# Patient Record
Sex: FEMALE | Race: WHITE | NOT HISPANIC OR LATINO
[De-identification: names, ages, dates, MRNs, and addresses within clinical notes are randomized per-mention and may not be internally consistent; named-entity substitution may affect disease eponyms.]

---

## 2021-09-23 ENCOUNTER — RESULT REVIEW (OUTPATIENT)
Age: 61
End: 2021-09-23

## 2022-03-18 PROBLEM — Z00.00 ENCOUNTER FOR PREVENTIVE HEALTH EXAMINATION: Status: ACTIVE | Noted: 2022-03-18

## 2022-08-19 ENCOUNTER — NON-APPOINTMENT (OUTPATIENT)
Age: 62
End: 2022-08-19

## 2022-08-22 ENCOUNTER — APPOINTMENT (OUTPATIENT)
Dept: ENDOCRINOLOGY | Facility: CLINIC | Age: 62
End: 2022-08-22

## 2022-08-22 ENCOUNTER — TRANSCRIPTION ENCOUNTER (OUTPATIENT)
Age: 62
End: 2022-08-22

## 2022-08-22 VITALS
RESPIRATION RATE: 16 BRPM | BODY MASS INDEX: 27.14 KG/M2 | OXYGEN SATURATION: 97 % | DIASTOLIC BLOOD PRESSURE: 82 MMHG | WEIGHT: 159 LBS | TEMPERATURE: 97.5 F | HEIGHT: 64.25 IN | HEART RATE: 69 BPM | SYSTOLIC BLOOD PRESSURE: 120 MMHG

## 2022-08-22 DIAGNOSIS — Z87.891 PERSONAL HISTORY OF NICOTINE DEPENDENCE: ICD-10-CM

## 2022-08-22 DIAGNOSIS — M85.80 OTHER SPECIFIED DISORDERS OF BONE DENSITY AND STRUCTURE, UNSPECIFIED SITE: ICD-10-CM

## 2022-08-22 DIAGNOSIS — M81.0 AGE-RELATED OSTEOPOROSIS W/OUT CURRENT PATHOLOGICAL FRACTURE: ICD-10-CM

## 2022-08-22 DIAGNOSIS — Z83.3 FAMILY HISTORY OF DIABETES MELLITUS: ICD-10-CM

## 2022-08-22 DIAGNOSIS — Z82.69 FAMILY HISTORY OF OTHER DISEASES OF THE MUSCULOSKELETAL SYSTEM AND CONNECTIVE TISSUE: ICD-10-CM

## 2022-08-22 PROCEDURE — 99204 OFFICE O/P NEW MOD 45 MIN: CPT

## 2022-08-22 PROCEDURE — ZZZZZ: CPT

## 2022-08-22 PROCEDURE — 77080 DXA BONE DENSITY AXIAL: CPT

## 2022-08-23 PROBLEM — Z83.3 FAMILY HISTORY OF DIABETES MELLITUS: Status: ACTIVE | Noted: 2022-08-23

## 2022-08-23 PROBLEM — M85.80 OSTEOPENIA: Status: ACTIVE | Noted: 2022-08-23

## 2022-08-23 PROBLEM — M81.0 OSTEOPOROSIS: Status: ACTIVE | Noted: 2022-08-23

## 2022-08-23 PROBLEM — Z82.69 FAMILY HISTORY OF OSTEOPENIA: Status: ACTIVE | Noted: 2022-08-23

## 2022-08-23 PROBLEM — Z87.891 FORMER SMOKER: Status: ACTIVE | Noted: 2022-08-22

## 2022-08-23 RX ORDER — OMEGA-3/DHA/EPA/FISH OIL 300-1000MG
CAPSULE ORAL
Refills: 0 | Status: ACTIVE | COMMUNITY

## 2022-08-23 RX ORDER — VALACYCLOVIR 1 G/1
1 TABLET, FILM COATED ORAL
Qty: 63 | Refills: 0 | Status: COMPLETED | COMMUNITY
Start: 2022-06-13

## 2022-08-23 RX ORDER — MULTIVITAMIN
TABLET ORAL
Refills: 0 | Status: ACTIVE | COMMUNITY

## 2022-08-23 RX ORDER — CHROMIUM 200 MCG
1000 TABLET ORAL
Refills: 0 | Status: ACTIVE | COMMUNITY

## 2022-08-23 RX ORDER — NITROFURANTOIN (MONOHYDRATE/MACROCRYSTALS) 25; 75 MG/1; MG/1
100 CAPSULE ORAL
Qty: 90 | Refills: 0 | Status: COMPLETED | COMMUNITY
Start: 2022-06-14

## 2022-08-23 RX ORDER — COVID-19 ANTIGEN TEST
KIT MISCELLANEOUS
Qty: 8 | Refills: 0 | Status: COMPLETED | COMMUNITY
Start: 2022-07-26

## 2022-08-23 NOTE — PROCEDURE
[FreeTextEntry1] : Bone Density August 22, 2022\par Spine not reported due to arthritis \par Total Hip -0.7 normal \par Femoral Neck -2.2 osteopenia \par Proximal Radius -0.8 normal , no prior \par \par Bone Density December 2021 Outside study [facility] \par Spine normal falsely elevated due to arthritis \par Total Hip -0.9 normal \par Femoral Neck -2.5 \par

## 2022-08-23 NOTE — CONSULT LETTER
[Dear  ___] : Dear  [unfilled], [Consult Letter:] : I had the pleasure of evaluating your patient, [unfilled]. [( Thank you for referring [unfilled] for consultation for _____ )] : Thank you for referring [unfilled] for consultation for [unfilled] [Consult Closing:] : Thank you very much for allowing me to participate in the care of this patient.  If you have any questions, please do not hesitate to contact me. [Sincerely,] : Sincerely, [FreeTextEntry2] : Dr. Tanja Guillaume \par [address] [facility] \par [place] . [place] [address]\par  [FreeTextEntry3] : Dr. Cordova

## 2022-08-23 NOTE — ASSESSMENT
[FreeTextEntry1] : Ai Santiago is a 62 year old female with osteopenia \par \par Pt was made aware of osteoporosis after a BMD which indicated that she had osteoporosis  present in the hip. Family hx of osteopenia from the mother. No hx of fractures.\par \par  BMD August 2022 performed here shows a moderately better result consistent with osteopenia in femoral neck.  The estimated risk of fracture is moderate.  I do not believe this patient is a candidate for medical therapy at this time.  The patient was advised to repeat bone density in 2 years but also advised that she probably will need medical therapy as menopause progresses.\par \par \par I recommend taking 1000 IU of Vitamin D. I recommend taking no more than 500 mg of calcium \par \par \par Labs October 2021\par Calcium normal 9.6\par Creatinine normal 0.87\par Vitamin d 65.6 \par TSH 1.13 \par \par Follow up in 1 year

## 2022-08-23 NOTE — REASON FOR VISIT
[Consultation] : a consultation visit [Osteoporosis] : osteoporosis [FreeTextEntry2] : Dr. Tanja Guillaume

## 2022-08-23 NOTE — HISTORY OF PRESENT ILLNESS
[FreeTextEntry1] : Ai Santiago is a 62 year old female presenting to the office today for consultation for osteoporosis. Pt was made aware of osteoporosis after a BMD which indicated that she had osteoporosis  present in the hip. Family hx of osteopenia from the mother. No hx of fractures. \par Patient remains perimenopausal with irregular menses.  Not planning hormone replacement therapy\par Pt has no Hx of kidney stones or calcium issues. No Hx of anorexia nervosa. No ulcers or bleeding. No unusual risks for osteoporosis such as calcium disro. No Hx of RT. . No Hx of Paget's disease. No Hx of DVT or PE. Up to date with routine care. \par \par Pt had some steroid use in the past. \par \par Pt had surgery on the shoulder, c spine surgery anterior approach, c section, hand surgery.  \par \par Pt diet consists of yogurt everyday.

## 2022-08-23 NOTE — END OF VISIT
[FreeTextEntry3] : This note was written by Margi Jasmine on ( August 22, 2022) acting as a medical scribe for Dr. Cordova. This note was authored by the medical scribe for me. I have reviewed, edited, and revised the note as needed. I am in agreement with the exam findings, imaging findings, and treatment plan.  Dionisio Cordova MD

## 2023-05-01 ENCOUNTER — OFFICE (OUTPATIENT)
Dept: URBAN - METROPOLITAN AREA CLINIC 35 | Facility: CLINIC | Age: 63
Setting detail: OPHTHALMOLOGY
End: 2023-05-01
Payer: COMMERCIAL

## 2023-05-01 DIAGNOSIS — H01.005: ICD-10-CM

## 2023-05-01 DIAGNOSIS — H01.002: ICD-10-CM

## 2023-05-01 DIAGNOSIS — H43.393: ICD-10-CM

## 2023-05-01 DIAGNOSIS — H25.013: ICD-10-CM

## 2023-05-01 DIAGNOSIS — H40.013: ICD-10-CM

## 2023-05-01 PROCEDURE — 99213 OFFICE O/P EST LOW 20 MIN: CPT | Performed by: OPHTHALMOLOGY

## 2023-05-01 ASSESSMENT — SPHEQUIV_DERIVED
OD_SPHEQUIV: 0
OS_SPHEQUIV: 0.125
OD_SPHEQUIV: -0.625
OS_SPHEQUIV: 0.375
OD_SPHEQUIV: -0.5

## 2023-05-01 ASSESSMENT — REFRACTION_CURRENTRX
OD_VPRISM_DIRECTION: SV
OD_CYLINDER: SPHERE
OS_CYLINDER: SPHERE
OD_SPHERE: +1.50
OS_OVR_VA: 20/
OS_VPRISM_DIRECTION: SV
OD_OVR_VA: 20/
OS_SPHERE: +1.50

## 2023-05-01 ASSESSMENT — REFRACTION_MANIFEST
OD_AXIS: 110
OU_VA: 20/20-
OS_VA1: 20/20
OD_CYLINDER: -0.50
OS_VA1: 20/20
OD_SPHERE: -0.25
OD_AXIS: 128
OS_SPHERE: -0.50
OD_VA1: 20/20-
OD_SPHERE: -0.50
OS_AXIS: 100
OS_CYLINDER: -0.25
OS_CYLINDER: SPHERE
OD_VA1: 20/20-2
OD_CYLINDER: -0.25
OS_SPHERE: +0.25

## 2023-05-01 ASSESSMENT — AXIALLENGTH_DERIVED
OD_AL: 23.2437
OS_AL: 22.6579
OD_AL: 23.0098
OD_AL: 23.1966
OS_AL: 22.7481

## 2023-05-01 ASSESSMENT — KERATOMETRY
OS_K2POWER_DIOPTERS: 46.25
OD_AXISANGLE_DEGREES: 068
METHOD_AUTO_MANUAL: AUTO
OS_K1POWER_DIOPTERS: 45.25
OD_K2POWER_DIOPTERS: 45.50
OS_AXISANGLE_DEGREES: 103
OD_K1POWER_DIOPTERS: 44.75

## 2023-05-01 ASSESSMENT — REFRACTION_AUTOREFRACTION
OD_SPHERE: +0.25
OS_CYLINDER: -0.25
OS_SPHERE: +0.50
OD_AXIS: 110
OD_CYLINDER: -0.50
OS_AXIS: 065

## 2023-05-01 ASSESSMENT — CONFRONTATIONAL VISUAL FIELD TEST (CVF)
OD_FINDINGS: FULL
OS_FINDINGS: FULL

## 2023-05-01 ASSESSMENT — TONOMETRY
OD_IOP_MMHG: 20
OS_IOP_MMHG: 21

## 2023-05-01 ASSESSMENT — LID EXAM ASSESSMENTS
OS_BLEPHARITIS: LLL 1+
OD_MEIBOMITIS: RLL 1+
OS_MEIBOMITIS: LLL 1+
OD_BLEPHARITIS: RLL 1+

## 2023-05-01 ASSESSMENT — VISUAL ACUITY
OS_BCVA: 20/20
OD_BCVA: 20/20

## 2023-05-01 ASSESSMENT — PACHYMETRY
OD_CT_CORRECTION: -2
OS_CT_CORRECTION: -1
OD_CT_UM: 576
OS_CT_UM: 569

## 2023-10-09 ENCOUNTER — OFFICE (OUTPATIENT)
Dept: URBAN - METROPOLITAN AREA CLINIC 35 | Facility: CLINIC | Age: 63
Setting detail: OPHTHALMOLOGY
End: 2023-10-09
Payer: COMMERCIAL

## 2023-10-09 DIAGNOSIS — H25.013: ICD-10-CM

## 2023-10-09 DIAGNOSIS — H40.053: ICD-10-CM

## 2023-10-09 DIAGNOSIS — H40.013: ICD-10-CM

## 2023-10-09 DIAGNOSIS — D31.02: ICD-10-CM

## 2023-10-09 DIAGNOSIS — H43.393: ICD-10-CM

## 2023-10-09 DIAGNOSIS — H01.002: ICD-10-CM

## 2023-10-09 DIAGNOSIS — H01.005: ICD-10-CM

## 2023-10-09 PROCEDURE — 92014 COMPRE OPH EXAM EST PT 1/>: CPT | Performed by: OPHTHALMOLOGY

## 2023-10-09 PROCEDURE — 92083 EXTENDED VISUAL FIELD XM: CPT | Performed by: OPHTHALMOLOGY

## 2023-10-09 PROCEDURE — 92133 CPTRZD OPH DX IMG PST SGM ON: CPT | Performed by: OPHTHALMOLOGY

## 2023-10-09 ASSESSMENT — AXIALLENGTH_DERIVED
OD_AL: 23.0535
OS_AL: 22.7908
OD_AL: 23.241
OD_AL: 23.2884
OS_AL: 22.7003

## 2023-10-09 ASSESSMENT — REFRACTION_MANIFEST
OS_CYLINDER: -0.25
OD_VA1: 20/20-2
OS_SPHERE: -0.50
OD_AXIS: 110
OS_VA1: 20/20
OS_AXIS: 100
OD_SPHERE: -0.25
OS_SPHERE: +0.25
OS_VA1: 20/20
OU_VA: 20/20-
OD_CYLINDER: -0.50
OD_AXIS: 128
OS_CYLINDER: SPHERE
OD_CYLINDER: -0.25
OD_SPHERE: -0.50
OD_VA1: 20/20-

## 2023-10-09 ASSESSMENT — SPHEQUIV_DERIVED
OS_SPHEQUIV: 0.375
OD_SPHEQUIV: -0.5
OD_SPHEQUIV: -0.625
OD_SPHEQUIV: 0
OS_SPHEQUIV: 0.125

## 2023-10-09 ASSESSMENT — REFRACTION_AUTOREFRACTION
OD_CYLINDER: -0.50
OS_SPHERE: +0.50
OS_AXIS: 072
OS_CYLINDER: -0.25
OD_SPHERE: +0.25
OD_AXIS: 107

## 2023-10-09 ASSESSMENT — KERATOMETRY
OD_AXISANGLE_DEGREES: 065
OS_K1POWER_DIOPTERS: 45.25
OD_K1POWER_DIOPTERS: 44.75
METHOD_AUTO_MANUAL: AUTO
OD_K2POWER_DIOPTERS: 45.25
OS_AXISANGLE_DEGREES: 102
OS_K2POWER_DIOPTERS: 46.00

## 2023-10-09 ASSESSMENT — REFRACTION_CURRENTRX
OD_CYLINDER: SPHERE
OD_OVR_VA: 20/
OS_CYLINDER: SPHERE
OD_VPRISM_DIRECTION: SV
OD_SPHERE: +1.50
OS_SPHERE: +1.50
OS_OVR_VA: 20/
OS_VPRISM_DIRECTION: SV

## 2023-10-09 ASSESSMENT — PACHYMETRY
OS_CT_UM: 569
OD_CT_CORRECTION: -2
OS_CT_CORRECTION: -1
OD_CT_UM: 576

## 2023-10-09 ASSESSMENT — LID EXAM ASSESSMENTS
OD_BLEPHARITIS: RLL 1+
OS_BLEPHARITIS: LLL 1+
OD_MEIBOMITIS: RLL 1+
OS_MEIBOMITIS: LLL 1+

## 2023-10-09 ASSESSMENT — VISUAL ACUITY
OS_BCVA: 20/20
OD_BCVA: 20/20

## 2023-10-09 ASSESSMENT — CONFRONTATIONAL VISUAL FIELD TEST (CVF)
OS_FINDINGS: FULL
OD_FINDINGS: FULL

## 2024-03-13 ENCOUNTER — OFFICE (OUTPATIENT)
Dept: URBAN - METROPOLITAN AREA CLINIC 35 | Facility: CLINIC | Age: 64
Setting detail: OPHTHALMOLOGY
End: 2024-03-13
Payer: COMMERCIAL

## 2024-03-13 DIAGNOSIS — H40.053: ICD-10-CM

## 2024-03-13 DIAGNOSIS — H01.005: ICD-10-CM

## 2024-03-13 DIAGNOSIS — D31.02: ICD-10-CM

## 2024-03-13 DIAGNOSIS — H40.013: ICD-10-CM

## 2024-03-13 DIAGNOSIS — H25.013: ICD-10-CM

## 2024-03-13 DIAGNOSIS — H01.002: ICD-10-CM

## 2024-03-13 PROCEDURE — 99213 OFFICE O/P EST LOW 20 MIN: CPT | Performed by: OPHTHALMOLOGY

## 2024-03-13 ASSESSMENT — REFRACTION_CURRENTRX
OD_VPRISM_DIRECTION: SV
OS_CYLINDER: SPHERE
OD_CYLINDER: SPHERE
OD_SPHERE: +1.50
OS_VPRISM_DIRECTION: SV
OD_OVR_VA: 20/
OS_SPHERE: +1.50
OS_OVR_VA: 20/

## 2024-03-13 ASSESSMENT — REFRACTION_MANIFEST
OS_SPHERE: +0.25
OS_VA1: 20/20
OS_CYLINDER: SPHERE
OD_VA1: 20/20-2
OS_VA1: 20/20
OS_SPHERE: -0.50
OD_SPHERE: -0.25
OD_AXIS: 110
OD_CYLINDER: -0.25
OD_CYLINDER: -0.50
OD_AXIS: 128
OU_VA: 20/20-
OS_AXIS: 100
OD_VA1: 20/20-
OD_SPHERE: -0.50
OS_CYLINDER: -0.25

## 2024-03-13 ASSESSMENT — SPHEQUIV_DERIVED
OD_SPHEQUIV: -0.625
OD_SPHEQUIV: -0.5
OS_SPHEQUIV: 0.125

## 2024-03-13 ASSESSMENT — LID EXAM ASSESSMENTS
OS_MEIBOMITIS: LLL 1+
OS_BLEPHARITIS: LLL 1+
OD_MEIBOMITIS: RLL 1+
OD_BLEPHARITIS: RLL 1+

## 2024-06-29 ENCOUNTER — NON-APPOINTMENT (OUTPATIENT)
Age: 64
End: 2024-06-29

## 2024-07-15 ENCOUNTER — DOCTOR'S OFFICE (OUTPATIENT)
Age: 64
Setting detail: OPHTHALMOLOGY
End: 2024-07-15
Payer: COMMERCIAL

## 2024-07-15 ENCOUNTER — RX ONLY (RX ONLY)
Age: 64
End: 2024-07-15

## 2024-07-15 DIAGNOSIS — H25.013: ICD-10-CM

## 2024-07-15 DIAGNOSIS — H40.013: ICD-10-CM

## 2024-07-15 DIAGNOSIS — D31.02: ICD-10-CM

## 2024-07-15 DIAGNOSIS — H01.005: ICD-10-CM

## 2024-07-15 DIAGNOSIS — H01.002: ICD-10-CM

## 2024-07-15 DIAGNOSIS — H40.053: ICD-10-CM

## 2024-07-15 PROCEDURE — 99213 OFFICE O/P EST LOW 20 MIN: CPT | Performed by: OPHTHALMOLOGY

## 2024-07-15 ASSESSMENT — LID EXAM ASSESSMENTS
OS_BLEPHARITIS: LLL 1+
OD_BLEPHARITIS: RLL 1+
OD_MEIBOMITIS: RLL 1+
OS_MEIBOMITIS: LLL 1+

## 2024-09-23 ENCOUNTER — APPOINTMENT (OUTPATIENT)
Dept: ENDOCRINOLOGY | Facility: CLINIC | Age: 64
End: 2024-09-23
Payer: COMMERCIAL

## 2024-09-23 VITALS
HEART RATE: 72 BPM | BODY MASS INDEX: 28.17 KG/M2 | DIASTOLIC BLOOD PRESSURE: 82 MMHG | WEIGHT: 165 LBS | HEIGHT: 64.2 IN | OXYGEN SATURATION: 98 % | SYSTOLIC BLOOD PRESSURE: 132 MMHG

## 2024-09-23 DIAGNOSIS — Z87.39 PERSONAL HISTORY OF OTHER DISEASES OF THE MUSCULOSKELETAL SYSTEM AND CONNECTIVE TISSUE: ICD-10-CM

## 2024-09-23 DIAGNOSIS — M85.80 OTHER SPECIFIED DISORDERS OF BONE DENSITY AND STRUCTURE, UNSPECIFIED SITE: ICD-10-CM

## 2024-09-23 PROCEDURE — ZZZZZ: CPT

## 2024-09-23 PROCEDURE — 77080 DXA BONE DENSITY AXIAL: CPT

## 2024-09-23 RX ORDER — ATORVASTATIN CALCIUM 10 MG/1
10 TABLET, FILM COATED ORAL
Refills: 0 | Status: ACTIVE | COMMUNITY

## 2024-09-23 NOTE — END OF VISIT
[FreeTextEntry3] :  This note was written by Kyara Burton on (September 23, 2024) acting as a medical scribe for Dr. Cordova This note was authored by the medical scribe for me. I have reviewed, edited, and revised the note as needed. I am in agreement with the exam findings, imaging findings, and treatment plan.  Dionisio Cordova MD

## 2024-09-23 NOTE — PROCEDURE
[FreeTextEntry1] : Bone Mineral Density: 09/23/2024 Indication: Comparison to 2022, assess response to medication Spine: not performed  Total hip: -0.9, normal, no significant change Femoral neck: -2.3, osteopenia, no significant change Proximal radius: -0.9, normal, no significant change  Bone Density August 22, 2022 Spine not reported due to arthritis  Total Hip -0.7 normal  Femoral Neck -2.2 osteopenia  Proximal Radius -0.8 normal , no prior   Bone Density December 2021 Outside study [facility]  Spine normal falsely elevated due to arthritis  Total Hip -0.9 normal  Femoral Neck -2.5

## 2024-09-23 NOTE — ASSESSMENT
[FreeTextEntry1] : 65 y/o female returns for a follow-up visit for osteopenia  Pt was made aware of osteoporosis after a BMD which indicated that she had osteoporosis present in the hip. Family hx of osteopenia from the mother. No hx of fractures. BMD August 2022 performed here shows a moderately better result consistent with osteopenia in femoral neck.  The estimated risk of fracture is moderate. BMD 09/2024 indicates normal total hip and prox. radius, stable osteopenia in fem neck. BMD results reviewed w/pt. Bone density is stable and in the osteopenia range, pt is not a candidate for medical therapy at this time.  The patient was advised to repeat bone density in 2 years but also advised that she probably will need medical therapy as menopause progresses.  I recommend taking 1000 IU of Vitamin D. I recommend taking no more than 500 mg of calcium    Follow up in 2 years and repeat BMD.

## 2024-09-23 NOTE — HISTORY OF PRESENT ILLNESS
[FreeTextEntry1] :  Pt returns for a follow-up visit for osteopenia. No interval health changes. No major surgeries, hospitalizations, fractures or changes in medication. Up to date with dentist. No major dental work planned.  Pt presents for consultation for osteoporosis. Pt was made aware of osteoporosis after a BMD which indicated that she had osteoporosis present in the hip. Family hx of osteopenia from the mother. No hx of fractures. Patient remains perimenopausal with irregular menses.  Not planning hormone replacement therapy.  BMD August 2022 performed here shows a moderately better result consistent with osteopenia in femoral neck.  The estimated risk of fracture is moderate. BMD 09/2024 indicates normal total hip and prox. radius, stable osteopenia in fem neck. BMD results reviewed w/pt. Bone density is stable and in the osteopenia range, pt is not a candidate for medical therapy at this time.   Pt has no Hx of kidney stones or calcium issues. No Hx of anorexia nervosa. No ulcers or bleeding. No unusual risks for osteoporosis such as calcium disro. No Hx of RT. No Hx of Paget's disease. No Hx of DVT or PE. Up to date with routine care.   Pt had some steroid use in the past.   Pt had surgery on the shoulder, c spine surgery anterior approach, c section, hand surgery.    Pt diet consists of yogurt everyday.

## 2024-09-23 NOTE — PROCEDURE
[FreeTextEntry1] : Bone Mineral Density: 09/23/2024 Indication: Comparison to 2022, assess response to medication Spine: not performed  Total hip: -0.9, normal, no significant change Femoral neck: -2.3, osteopenia, no significant change Proximal radius: -0.9, normal, no significant change  Bone Density August 22, 2022 Spine not reported due to arthritis  Total Hip -0.7 normal  Femoral Neck -2.2 osteopenia  Proximal Radius -0.8 normal , no prior   Bone Density December 2021 Outside study [facility]  Spine normal falsely elevated due to arthritis  Total Hip -0.9 normal  Femoral Neck -2.5

## 2024-11-14 ENCOUNTER — DOCTOR'S OFFICE (OUTPATIENT)
Facility: LOCATION | Age: 64
Setting detail: OPHTHALMOLOGY
End: 2024-11-14
Payer: COMMERCIAL

## 2024-11-14 DIAGNOSIS — H43.393: ICD-10-CM

## 2024-11-14 DIAGNOSIS — D31.02: ICD-10-CM

## 2024-11-14 DIAGNOSIS — H01.005: ICD-10-CM

## 2024-11-14 DIAGNOSIS — H40.053: ICD-10-CM

## 2024-11-14 DIAGNOSIS — H25.013: ICD-10-CM

## 2024-11-14 DIAGNOSIS — H40.013: ICD-10-CM

## 2024-11-14 DIAGNOSIS — H01.002: ICD-10-CM

## 2024-11-14 PROCEDURE — 92133 CPTRZD OPH DX IMG PST SGM ON: CPT | Performed by: OPHTHALMOLOGY

## 2024-11-14 PROCEDURE — 92083 EXTENDED VISUAL FIELD XM: CPT | Performed by: OPHTHALMOLOGY

## 2024-11-14 PROCEDURE — 92014 COMPRE OPH EXAM EST PT 1/>: CPT | Performed by: OPHTHALMOLOGY

## 2024-11-14 ASSESSMENT — TONOMETRY
OS_IOP_MMHG: 19
OD_IOP_MMHG: 19

## 2024-11-14 ASSESSMENT — LID EXAM ASSESSMENTS
OD_MEIBOMITIS: RLL 1+
OD_BLEPHARITIS: RLL 1+
OS_MEIBOMITIS: LLL 1+
OS_BLEPHARITIS: LLL 1+

## 2024-11-14 ASSESSMENT — CONFRONTATIONAL VISUAL FIELD TEST (CVF)
OD_FINDINGS: FULL
OS_FINDINGS: FULL

## 2024-11-14 ASSESSMENT — PACHYMETRY
OD_CT_UM: 576
OS_CT_CORRECTION: -1
OD_CT_CORRECTION: -2
OS_CT_UM: 569

## 2024-11-20 ASSESSMENT — REFRACTION_CURRENTRX
OD_CYLINDER: SPHERE
OS_SPHERE: +1.50
OS_CYLINDER: SPHERE
OD_OVR_VA: 20/
OS_OVR_VA: 20/
OS_VPRISM_DIRECTION: SV
OD_VPRISM_DIRECTION: SV
OD_SPHERE: +1.50

## 2024-11-20 ASSESSMENT — REFRACTION_MANIFEST
OS_SPHERE: -0.50
OS_SPHERE: +0.25
OD_SPHERE: -0.50
OD_VA1: 20/20-2
OS_VA1: 20/20
OD_CYLINDER: -0.25
OU_VA: 20/20-
OD_VA1: 20/20-
OD_AXIS: 110
OD_AXIS: 128
OS_CYLINDER: SPHERE
OD_CYLINDER: -0.50
OS_CYLINDER: -0.25
OD_SPHERE: -0.25
OS_AXIS: 100
OS_VA1: 20/20

## 2024-11-20 ASSESSMENT — KERATOMETRY
OD_K2POWER_DIOPTERS: 45.25
OS_K1POWER_DIOPTERS: 45.25
OS_K2POWER_DIOPTERS: 46.00
OD_AXISANGLE_DEGREES: 065
OD_K1POWER_DIOPTERS: 44.75
OS_AXISANGLE_DEGREES: 102
METHOD_AUTO_MANUAL: AUTO

## 2024-11-20 ASSESSMENT — VISUAL ACUITY
OD_BCVA: 20/20
OS_BCVA: 20/20

## 2024-11-20 ASSESSMENT — REFRACTION_AUTOREFRACTION
OD_AXIS: 109
OS_SPHERE: +0.50
OS_AXIS: 074
OS_CYLINDER: -0.50
OD_CYLINDER: -0.50
OD_SPHERE: +0.25

## 2025-04-18 ENCOUNTER — OFFICE (OUTPATIENT)
Dept: URBAN - METROPOLITAN AREA CLINIC 27 | Facility: CLINIC | Age: 65
Setting detail: OPHTHALMOLOGY
End: 2025-04-18
Payer: COMMERCIAL

## 2025-04-18 DIAGNOSIS — H33.312: ICD-10-CM

## 2025-04-18 DIAGNOSIS — H40.053: ICD-10-CM

## 2025-04-18 DIAGNOSIS — H43.393: ICD-10-CM

## 2025-04-18 DIAGNOSIS — H35.411: ICD-10-CM

## 2025-04-18 PROCEDURE — 92201 OPSCPY EXTND RTA DRAW UNI/BI: CPT | Performed by: OPTOMETRIST

## 2025-04-18 PROCEDURE — 92250 FUNDUS PHOTOGRAPHY W/I&R: CPT | Performed by: OPTOMETRIST

## 2025-04-18 PROCEDURE — 92012 INTRM OPH EXAM EST PATIENT: CPT | Performed by: OPTOMETRIST

## 2025-04-18 ASSESSMENT — REFRACTION_MANIFEST
OD_SPHERE: -0.50
OS_AXIS: 100
OS_SPHERE: -0.50
OD_VA1: 20/20-
OS_VA1: 20/20
OD_VA1: 20/20-2
OS_CYLINDER: SPHERE
OD_CYLINDER: -0.50
OD_AXIS: 110
OS_SPHERE: +0.25
OD_CYLINDER: -0.25
OD_AXIS: 128
OD_SPHERE: -0.25
OS_CYLINDER: -0.25
OS_VA1: 20/20
OU_VA: 20/20-

## 2025-04-18 ASSESSMENT — CONFRONTATIONAL VISUAL FIELD TEST (CVF)
OD_FINDINGS: FULL
OS_FINDINGS: FULL

## 2025-04-18 ASSESSMENT — LID EXAM ASSESSMENTS
OD_BLEPHARITIS: RLL 1+
OS_BLEPHARITIS: LLL 1+
OD_MEIBOMITIS: RLL 1+
OS_MEIBOMITIS: LLL 1+

## 2025-04-18 ASSESSMENT — REFRACTION_CURRENTRX
OD_OVR_VA: 20/
OD_VPRISM_DIRECTION: SV
OS_VPRISM_DIRECTION: SV
OD_CYLINDER: SPHERE
OS_CYLINDER: SPHERE
OS_SPHERE: +1.50
OD_SPHERE: +1.50
OS_OVR_VA: 20/

## 2025-04-18 ASSESSMENT — TONOMETRY
OD_IOP_MMHG: 19
OS_IOP_MMHG: 19

## 2025-04-18 ASSESSMENT — KERATOMETRY
OD_AXISANGLE_DEGREES: 078
OS_AXISANGLE_DEGREES: 090
OD_K2POWER_DIOPTERS: 46.00
OS_K2POWER_DIOPTERS: 46.50
OD_K1POWER_DIOPTERS: 45.00
OS_K1POWER_DIOPTERS: 45.25
METHOD_AUTO_MANUAL: AUTO

## 2025-04-18 ASSESSMENT — PACHYMETRY
OS_CT_CORRECTION: -1
OD_CT_UM: 576
OS_CT_UM: 569
OD_CT_CORRECTION: -2

## 2025-04-18 ASSESSMENT — REFRACTION_AUTOREFRACTION
OS_AXIS: 160
OD_AXIS: 025
OD_SPHERE: -0.25
OS_CYLINDER: +0.50
OS_SPHERE: PLANO
OD_CYLINDER: +0.50

## 2025-04-18 ASSESSMENT — VISUAL ACUITY
OD_BCVA: 20/20
OS_BCVA: 20/25-2

## 2025-04-21 ENCOUNTER — DOCTOR'S OFFICE (OUTPATIENT)
Facility: LOCATION | Age: 65
Setting detail: OPHTHALMOLOGY
End: 2025-04-21
Payer: COMMERCIAL

## 2025-04-21 DIAGNOSIS — H33.312: ICD-10-CM

## 2025-04-21 DIAGNOSIS — H35.411: ICD-10-CM

## 2025-04-21 DIAGNOSIS — H40.053: ICD-10-CM

## 2025-04-21 PROCEDURE — 92014 COMPRE OPH EXAM EST PT 1/>: CPT | Performed by: OPHTHALMOLOGY

## 2025-04-21 ASSESSMENT — REFRACTION_MANIFEST
OD_AXIS: 128
OS_SPHERE: +0.25
OS_CYLINDER: SPHERE
OD_VA1: 20/20-
OU_VA: 20/20-
OS_AXIS: 100
OD_VA1: 20/20-2
OD_CYLINDER: -0.25
OS_CYLINDER: -0.25
OS_SPHERE: -0.50
OD_SPHERE: -0.50
OS_VA1: 20/20
OD_SPHERE: -0.25
OD_CYLINDER: -0.50
OS_VA1: 20/20
OD_AXIS: 110

## 2025-04-21 ASSESSMENT — KERATOMETRY
OS_AXISANGLE_DEGREES: 102
OD_K2POWER_DIOPTERS: 46.25
OD_K1POWER_DIOPTERS: 45.25
OS_K1POWER_DIOPTERS: 45.75
OD_AXISANGLE_DEGREES: 071
METHOD_AUTO_MANUAL: AUTO
OS_K2POWER_DIOPTERS: 46.50

## 2025-04-21 ASSESSMENT — PACHYMETRY
OS_CT_CORRECTION: -1
OD_CT_UM: 576
OD_CT_CORRECTION: -2
OS_CT_UM: 569

## 2025-04-21 ASSESSMENT — REFRACTION_CURRENTRX
OS_SPHERE: +1.50
OS_VPRISM_DIRECTION: SV
OS_CYLINDER: SPHERE
OD_CYLINDER: SPHERE
OD_VPRISM_DIRECTION: SV
OD_OVR_VA: 20/
OS_OVR_VA: 20/
OD_SPHERE: +1.50

## 2025-04-21 ASSESSMENT — REFRACTION_AUTOREFRACTION
OS_CYLINDER: -0.50
OS_SPHERE: +0.50
OD_AXIS: 115
OD_SPHERE: 0.00
OD_CYLINDER: -0.75
OS_AXIS: 069

## 2025-04-21 ASSESSMENT — VISUAL ACUITY
OD_BCVA: 20/20-1
OS_BCVA: 20/20-

## 2025-04-21 ASSESSMENT — TONOMETRY
OS_IOP_MMHG: 19
OD_IOP_MMHG: 20

## 2025-04-21 ASSESSMENT — LID EXAM ASSESSMENTS
OS_MEIBOMITIS: LLL 1+
OD_MEIBOMITIS: RLL 1+
OS_BLEPHARITIS: LLL 1+
OD_BLEPHARITIS: RLL 1+

## 2025-04-21 ASSESSMENT — CONFRONTATIONAL VISUAL FIELD TEST (CVF)
OD_FINDINGS: FULL
OS_FINDINGS: FULL

## 2025-06-13 ENCOUNTER — OFFICE (OUTPATIENT)
Facility: LOCATION | Age: 65
Setting detail: OPHTHALMOLOGY
End: 2025-06-13
Payer: COMMERCIAL

## 2025-06-13 DIAGNOSIS — H01.002: ICD-10-CM

## 2025-06-13 DIAGNOSIS — H25.013: ICD-10-CM

## 2025-06-13 DIAGNOSIS — H01.005: ICD-10-CM

## 2025-06-13 PROCEDURE — 92012 INTRM OPH EXAM EST PATIENT: CPT | Performed by: OPHTHALMOLOGY

## 2025-06-13 ASSESSMENT — REFRACTION_MANIFEST
OS_SPHERE: -0.50
OS_AXIS: 100
OD_CYLINDER: -0.50
OS_CYLINDER: -0.25
OD_CYLINDER: -0.25
OS_SPHERE: +0.25
OD_AXIS: 128
OD_SPHERE: -0.50
OS_CYLINDER: SPHERE
OD_VA1: 20/20-
OU_VA: 20/20-
OS_VA1: 20/20
OD_SPHERE: -0.25
OD_AXIS: 110
OS_VA1: 20/20
OD_VA1: 20/20-2

## 2025-06-13 ASSESSMENT — REFRACTION_AUTOREFRACTION
OS_AXIS: 069
OD_CYLINDER: -0.75
OD_SPHERE: 0.00
OS_SPHERE: +0.50
OS_CYLINDER: -0.50
OD_AXIS: 115

## 2025-06-13 ASSESSMENT — KERATOMETRY
OS_K2POWER_DIOPTERS: 46.50
OS_AXISANGLE_DEGREES: 102
METHOD_AUTO_MANUAL: AUTO
OD_K1POWER_DIOPTERS: 45.25
OD_K2POWER_DIOPTERS: 46.25
OD_AXISANGLE_DEGREES: 071
OS_K1POWER_DIOPTERS: 45.75

## 2025-06-13 ASSESSMENT — VISUAL ACUITY
OS_BCVA: 20/25+2
OD_BCVA: 20/20-

## 2025-06-13 ASSESSMENT — REFRACTION_CURRENTRX
OS_SPHERE: +1.50
OS_VPRISM_DIRECTION: SV
OD_VPRISM_DIRECTION: SV
OD_SPHERE: +1.50
OD_CYLINDER: SPHERE
OS_CYLINDER: SPHERE
OD_OVR_VA: 20/
OS_OVR_VA: 20/

## 2025-06-13 ASSESSMENT — LID EXAM ASSESSMENTS
OS_MEIBOMITIS: LLL 1+
OD_BLEPHARITIS: RLL 1+
OS_BLEPHARITIS: LLL 1+
OD_MEIBOMITIS: RLL 1+

## 2025-06-13 ASSESSMENT — CONFRONTATIONAL VISUAL FIELD TEST (CVF)
OS_FINDINGS: FULL
OD_FINDINGS: FULL

## 2025-06-18 ENCOUNTER — DOCTOR'S OFFICE (OUTPATIENT)
Facility: LOCATION | Age: 65
Setting detail: OPHTHALMOLOGY
End: 2025-06-18
Payer: COMMERCIAL

## 2025-06-18 DIAGNOSIS — H02.825: ICD-10-CM

## 2025-06-18 DIAGNOSIS — H02.822: ICD-10-CM

## 2025-06-18 DIAGNOSIS — H00.11: ICD-10-CM

## 2025-06-18 DIAGNOSIS — H01.002: ICD-10-CM

## 2025-06-18 PROBLEM — H10.45 ALLERGIC CONJUNCTIVITIS: Status: ACTIVE | Noted: 2025-06-13

## 2025-06-18 PROCEDURE — 99213 OFFICE O/P EST LOW 20 MIN: CPT | Performed by: OPHTHALMOLOGY

## 2025-06-18 ASSESSMENT — KERATOMETRY
OD_K2POWER_DIOPTERS: 46.00
OS_AXISANGLE_DEGREES: 098
OD_K1POWER_DIOPTERS: 45.25
OS_K1POWER_DIOPTERS: 45.75
METHOD_AUTO_MANUAL: AUTO
OD_AXISANGLE_DEGREES: 078
OS_K2POWER_DIOPTERS: 46.25

## 2025-06-18 ASSESSMENT — REFRACTION_MANIFEST
OD_VA1: 20/20-
OD_CYLINDER: -0.50
OS_VA1: 20/20
OD_AXIS: 110
OD_AXIS: 128
OD_SPHERE: -0.25
OS_SPHERE: +0.25
OD_CYLINDER: -0.25
OS_CYLINDER: -0.25
OS_SPHERE: -0.50
OD_SPHERE: -0.50
OS_CYLINDER: SPHERE
OU_VA: 20/20-
OS_VA1: 20/20
OS_AXIS: 100
OD_VA1: 20/20-2

## 2025-06-18 ASSESSMENT — REFRACTION_CURRENTRX
OS_VPRISM_DIRECTION: SV
OD_CYLINDER: SPHERE
OD_SPHERE: +1.50
OD_VPRISM_DIRECTION: SV
OS_CYLINDER: SPHERE
OS_OVR_VA: 20/
OS_SPHERE: +1.50
OD_OVR_VA: 20/

## 2025-06-18 ASSESSMENT — REFRACTION_AUTOREFRACTION
OS_AXIS: 169
OD_CYLINDER: +0.75
OD_AXIS: 007
OS_CYLINDER: +0.50
OD_SPHERE: -0.25
OS_SPHERE: +0.25

## 2025-06-18 ASSESSMENT — LID EXAM ASSESSMENTS
OS_BLEPHARITIS: LLL 1+
OD_BLEPHARITIS: RLL 1+
OS_MEIBOMITIS: LLL 1+
OD_MEIBOMITIS: RLL 1+

## 2025-06-18 ASSESSMENT — PACHYMETRY
OD_CT_UM: 576
OD_CT_CORRECTION: -2
OS_CT_CORRECTION: -1
OS_CT_UM: 569

## 2025-06-18 ASSESSMENT — TONOMETRY
OS_IOP_MMHG: 20
OD_IOP_MMHG: 20

## 2025-06-18 ASSESSMENT — VISUAL ACUITY
OD_BCVA: 20/20-
OS_BCVA: 20/20

## 2025-07-03 ENCOUNTER — RX ONLY (RX ONLY)
Age: 65
End: 2025-07-03

## 2025-07-03 ENCOUNTER — DOCTOR'S OFFICE (OUTPATIENT)
Facility: LOCATION | Age: 65
Setting detail: OPHTHALMOLOGY
End: 2025-07-03
Payer: COMMERCIAL

## 2025-07-03 DIAGNOSIS — H40.053: ICD-10-CM

## 2025-07-03 DIAGNOSIS — H02.822: ICD-10-CM

## 2025-07-03 DIAGNOSIS — H25.013: ICD-10-CM

## 2025-07-03 DIAGNOSIS — H00.11: ICD-10-CM

## 2025-07-03 DIAGNOSIS — H01.005: ICD-10-CM

## 2025-07-03 DIAGNOSIS — H01.002: ICD-10-CM

## 2025-07-03 DIAGNOSIS — H02.825: ICD-10-CM

## 2025-07-03 DIAGNOSIS — H10.45: ICD-10-CM

## 2025-07-03 PROCEDURE — 92012 INTRM OPH EXAM EST PATIENT: CPT | Performed by: OPHTHALMOLOGY

## 2025-07-03 ASSESSMENT — REFRACTION_MANIFEST
OD_AXIS: 110
OS_CYLINDER: SPHERE
OS_CYLINDER: -0.25
OD_VA1: 20/20-2
OD_AXIS: 128
OD_SPHERE: -0.25
OS_SPHERE: +0.25
OS_VA1: 20/20
OS_SPHERE: -0.50
OD_CYLINDER: -0.50
OD_CYLINDER: -0.25
OD_VA1: 20/20-
OS_VA1: 20/20
OU_VA: 20/20-
OS_AXIS: 100
OD_SPHERE: -0.50

## 2025-07-03 ASSESSMENT — REFRACTION_CURRENTRX
OD_OVR_VA: 20/
OD_VPRISM_DIRECTION: SV
OS_CYLINDER: SPHERE
OD_SPHERE: +1.50
OS_OVR_VA: 20/
OD_CYLINDER: SPHERE
OS_SPHERE: +1.50
OS_VPRISM_DIRECTION: SV

## 2025-07-03 ASSESSMENT — PACHYMETRY
OD_CT_CORRECTION: -2
OS_CT_UM: 569
OD_CT_UM: 576
OS_CT_CORRECTION: -1

## 2025-07-03 ASSESSMENT — KERATOMETRY
OD_AXISANGLE_DEGREES: 078
OS_K2POWER_DIOPTERS: 46.25
METHOD_AUTO_MANUAL: AUTO
OS_AXISANGLE_DEGREES: 098
OS_K1POWER_DIOPTERS: 45.75
OD_K1POWER_DIOPTERS: 45.25
OD_K2POWER_DIOPTERS: 46.00

## 2025-07-03 ASSESSMENT — VISUAL ACUITY
OD_BCVA: 20/20-3
OS_BCVA: 20/20-

## 2025-07-03 ASSESSMENT — REFRACTION_AUTOREFRACTION
OD_AXIS: 007
OD_CYLINDER: +0.75
OS_AXIS: 169
OD_SPHERE: -0.25
OS_SPHERE: +0.25
OS_CYLINDER: +0.50

## 2025-07-03 ASSESSMENT — LID EXAM ASSESSMENTS
OD_COMMENTS: MG INSPISSATION
OS_BLEPHARITIS: LLL 1+
OD_BLEPHARITIS: RLL 1+
OD_MEIBOMITIS: RLL 1+
OS_MEIBOMITIS: LLL 1+

## 2025-08-06 ENCOUNTER — DOCTOR'S OFFICE (OUTPATIENT)
Facility: LOCATION | Age: 65
Setting detail: OPHTHALMOLOGY
End: 2025-08-06
Payer: COMMERCIAL

## 2025-08-06 DIAGNOSIS — H10.45: ICD-10-CM

## 2025-08-06 DIAGNOSIS — H40.053: ICD-10-CM

## 2025-08-06 PROCEDURE — 92012 INTRM OPH EXAM EST PATIENT: CPT | Performed by: OPHTHALMOLOGY

## 2025-08-06 PROCEDURE — 92083 EXTENDED VISUAL FIELD XM: CPT | Performed by: OPHTHALMOLOGY

## 2025-08-06 PROCEDURE — 92133 CPTRZD OPH DX IMG PST SGM ON: CPT | Performed by: OPHTHALMOLOGY

## 2025-08-06 ASSESSMENT — REFRACTION_CURRENTRX
OD_CYLINDER: SPHERE
OS_SPHERE: +1.50
OS_VPRISM_DIRECTION: SV
OD_OVR_VA: 20/
OD_VPRISM_DIRECTION: SV
OS_CYLINDER: SPHERE
OD_SPHERE: +1.50
OS_OVR_VA: 20/

## 2025-08-06 ASSESSMENT — REFRACTION_MANIFEST
OD_SPHERE: -0.50
OS_VA1: 20/20
OS_AXIS: 100
OD_AXIS: 128
OU_VA: 20/20-
OS_SPHERE: -0.50
OD_CYLINDER: -0.50
OS_SPHERE: +0.25
OS_CYLINDER: -0.25
OD_AXIS: 110
OD_VA1: 20/20-2
OS_VA1: 20/20
OD_VA1: 20/20-
OS_CYLINDER: SPHERE
OD_CYLINDER: -0.25
OD_SPHERE: -0.25

## 2025-08-06 ASSESSMENT — PACHYMETRY
OD_CT_UM: 576
OS_CT_UM: 569
OS_CT_CORRECTION: -1
OD_CT_CORRECTION: -2

## 2025-08-06 ASSESSMENT — LID EXAM ASSESSMENTS
OD_BLEPHARITIS: RLL 1+
OD_COMMENTS: MG INSPISSATION
OS_BLEPHARITIS: LLL 1+
OD_MEIBOMITIS: RLL 1+
OS_MEIBOMITIS: LLL 1+

## 2025-08-06 ASSESSMENT — VISUAL ACUITY
OS_BCVA: 20/20
OD_BCVA: 20/20

## 2025-08-06 ASSESSMENT — KERATOMETRY
OS_K2POWER_DIOPTERS: 46.25
METHOD_AUTO_MANUAL: AUTO
OS_K1POWER_DIOPTERS: 45.75
OS_AXISANGLE_DEGREES: 098
OD_K1POWER_DIOPTERS: 45.25
OD_AXISANGLE_DEGREES: 078
OD_K2POWER_DIOPTERS: 46.00

## 2025-08-06 ASSESSMENT — REFRACTION_AUTOREFRACTION
OS_SPHERE: +0.75
OS_AXIS: 075
OD_SPHERE: +0.25
OD_CYLINDER: -0.75
OS_CYLINDER: -0.50
OD_AXIS: 093